# Patient Record
Sex: MALE | Race: BLACK OR AFRICAN AMERICAN | NOT HISPANIC OR LATINO
[De-identification: names, ages, dates, MRNs, and addresses within clinical notes are randomized per-mention and may not be internally consistent; named-entity substitution may affect disease eponyms.]

---

## 2019-09-06 ENCOUNTER — RESULT REVIEW (OUTPATIENT)
Age: 29
End: 2019-09-06

## 2020-10-27 ENCOUNTER — APPOINTMENT (OUTPATIENT)
Dept: ORTHOPEDIC SURGERY | Facility: CLINIC | Age: 30
End: 2020-10-27
Payer: COMMERCIAL

## 2020-10-27 VITALS — BODY MASS INDEX: 30.06 KG/M2 | HEIGHT: 70 IN | WEIGHT: 210 LBS

## 2020-10-27 PROCEDURE — 99203 OFFICE O/P NEW LOW 30 MIN: CPT

## 2020-10-27 PROCEDURE — 99072 ADDL SUPL MATRL&STAF TM PHE: CPT

## 2020-10-27 NOTE — PHYSICAL EXAM
[FreeTextEntry1] : Physical exam revealed a healthy looking patient in no apparent distress patient appears to be fully alert oriented examination of the right knee demonstrates swelling and fullness over the suprapatellar pouch stable knee no gross neurovascular deficit at this stage patient was recommended to be followed and to be seen again with MRI scan of the right knee the need for possible arthrotomy and resection of the process could not be ruled out

## 2020-10-27 NOTE — HISTORY OF PRESENT ILLNESS
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  Over the last 4 years patient was aware about PVNS of the right knee previously patient underwent triple arthroscopies.  At this stage patient having pain tenderness and a effusion and swelling of the right knee.

## 2020-11-03 ENCOUNTER — APPOINTMENT (OUTPATIENT)
Dept: ORTHOPEDIC SURGERY | Facility: CLINIC | Age: 30
End: 2020-11-03
Payer: COMMERCIAL

## 2020-11-03 PROCEDURE — 99213 OFFICE O/P EST LOW 20 MIN: CPT

## 2020-11-03 PROCEDURE — 99072 ADDL SUPL MATRL&STAF TM PHE: CPT

## 2020-11-03 NOTE — PHYSICAL EXAM
[FreeTextEntry1] : Physical exam reveals a healthy looking patient in no apparent distress patient appears to be fully alert oriented examination of the right knee demonstrate joint effusion with a fullness over the right suprapatellar pouch and no skin erythema patient having full range of motion of the right knee.  At this point this condition was discussed with the patient as being persistent pigmented villous nodular synovitis patient was recommended to consider a new chemotherapy treatment as opposed to go ahead with major surgical procedure arrangements were made for the patient to see the oncologist to discuss the new regimen therapy for this condition using turalio capsules.  The nature of this treatment was discussed with the patient and patient was referred to the oncologist to discuss the need for this kind of medicine.  However the need for possible future surgical procedure arthrotomy total synovectomy could not be ruled out.

## 2020-11-03 NOTE — HISTORY OF PRESENT ILLNESS
[FreeTextEntry1] : Patient was seen today in follow-up with a medical history of pigmented villous nodular synovitis right knee previously patient had 2 arthroscopies.  For the last 5 feels patient having intermittent pain swelling joint effusion of the right knee recent MRI scan demonstrate a residual masses over the anterior compartment of the right knee with several soft tissue masses occupying the popliteal area suggesting persistent pigmented villous nodular synovitis

## 2020-11-04 ENCOUNTER — APPOINTMENT (OUTPATIENT)
Dept: ORTHOPEDIC SURGERY | Facility: CLINIC | Age: 30
End: 2020-11-04
Payer: COMMERCIAL

## 2020-11-04 VITALS — BODY MASS INDEX: 30.06 KG/M2 | WEIGHT: 210 LBS | HEIGHT: 70 IN

## 2020-11-04 PROCEDURE — 99213 OFFICE O/P EST LOW 20 MIN: CPT

## 2020-11-04 PROCEDURE — 99072 ADDL SUPL MATRL&STAF TM PHE: CPT

## 2020-11-04 NOTE — HISTORY OF PRESENT ILLNESS
[FreeTextEntry1] : For the last 5 years patient has been complaining about intermittent pain swelling joint effusion over the right knee underwent 2 previous arthroscopies for diffuse PVNS.  Patient was seen yesterday and patient was recommended to be seen by the medical oncologist to obtain a trial of kind of new drug medication treatment.  Arrangements were made for the patient to be seen by the medical oncologist.  However after patient discussed the underlying condition with his family patient refusing to accept that kind of treatment and wished to undergo surgical procedure.

## 2020-11-04 NOTE — PHYSICAL EXAM
[FreeTextEntry1] : Physical exam revealed a healthy looking patient in no apparent distress patient appears to be fully alert oriented examination of the right knee demonstrate joint effusion with fullness and joint collection fluid over the suprapatellar pouch previously reviewed the MRI scan demonstrated residual persistent PVNS of the knee joint including popliteal fossa.  At this stage and arthrotomy of the right knee and total synovectomy was discussed with the patient the nature of the surgical procedure risk potential risk complications were discussed with the patient including progressive degenerative arthritic changes and the possibility of infectious process and septic knee could not be ruled out and after patient and understood the nature of the surgical procedure and potential complication still patient wishing to undergo the surgical procedure and again this was discussed with the patient arrangements are being made for the patient to undergo a arthrotomy and total synovectomy at the same time patient may have to have another surgical procedure open of the right popliteal fossa the risk of possible future local recurrence could not be ruled out the need for further future surgical procedure including the potential future total knee replacement could not be ruled out

## 2020-11-04 NOTE — REASON FOR VISIT
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

## 2020-11-18 ENCOUNTER — TRANSCRIPTION ENCOUNTER (OUTPATIENT)
Age: 30
End: 2020-11-18

## 2020-11-20 ENCOUNTER — OUTPATIENT (OUTPATIENT)
Dept: OUTPATIENT SERVICES | Facility: HOSPITAL | Age: 30
LOS: 1 days | End: 2020-11-20

## 2020-11-20 VITALS
OXYGEN SATURATION: 99 % | SYSTOLIC BLOOD PRESSURE: 118 MMHG | HEART RATE: 55 BPM | DIASTOLIC BLOOD PRESSURE: 82 MMHG | RESPIRATION RATE: 14 BRPM | WEIGHT: 216.05 LBS | HEIGHT: 69 IN | TEMPERATURE: 97 F

## 2020-11-20 DIAGNOSIS — M12.20 VILLONODULAR SYNOVITIS (PIGMENTED), UNSPECIFIED SITE: ICD-10-CM

## 2020-11-20 DIAGNOSIS — M12.269: ICD-10-CM

## 2020-11-20 DIAGNOSIS — Z98.890 OTHER SPECIFIED POSTPROCEDURAL STATES: Chronic | ICD-10-CM

## 2020-11-20 LAB
BLD GP AB SCN SERPL QL: NEGATIVE — SIGNIFICANT CHANGE UP
HCT VFR BLD CALC: 45.7 % — SIGNIFICANT CHANGE UP (ref 39–50)
HGB BLD-MCNC: 14 G/DL — SIGNIFICANT CHANGE UP (ref 13–17)
MCHC RBC-ENTMCNC: 22.5 PG — LOW (ref 27–34)
MCHC RBC-ENTMCNC: 30.6 % — LOW (ref 32–36)
MCV RBC AUTO: 73.5 FL — LOW (ref 80–100)
NRBC # FLD: 0 K/UL — SIGNIFICANT CHANGE UP (ref 0–0)
PLATELET # BLD AUTO: 246 K/UL — SIGNIFICANT CHANGE UP (ref 150–400)
PMV BLD: 11.1 FL — SIGNIFICANT CHANGE UP (ref 7–13)
RBC # BLD: 6.22 M/UL — HIGH (ref 4.2–5.8)
RBC # FLD: 16.2 % — HIGH (ref 10.3–14.5)
RH IG SCN BLD-IMP: POSITIVE — SIGNIFICANT CHANGE UP
WBC # BLD: 4.55 K/UL — SIGNIFICANT CHANGE UP (ref 3.8–10.5)
WBC # FLD AUTO: 4.55 K/UL — SIGNIFICANT CHANGE UP (ref 3.8–10.5)

## 2020-11-20 RX ORDER — SODIUM CHLORIDE 9 MG/ML
1000 INJECTION, SOLUTION INTRAVENOUS
Refills: 0 | Status: DISCONTINUED | OUTPATIENT
Start: 2020-12-03 | End: 2020-12-04

## 2020-11-20 NOTE — H&P PST ADULT - VENOUS THROMBOEMBOLISM FOR WOMEN ONLY
We would like to see you back in 4 months. Please come 30 minutes prior for lab work through your port.  We will also set you up for monthly prot flushes.    If you have any questions please call [phone]    Other instructions:  none    
(0) indicator not present

## 2020-11-20 NOTE — H&P PST ADULT - NEGATIVE GENERAL SYMPTOMS
no polyuria/no polydipsia/no chills/no anorexia/no malaise/no fever/no sweating/no polyphagia/no fatigue/no weight loss

## 2020-11-20 NOTE — H&P PST ADULT - HISTORY OF PRESENT ILLNESS
29y/o male scheduled for right knee arthrotomy and total synovectomy on 12/3/2020.  Pt states, "PVNS hx benign tumor in right knee for the past 6 yrs, growing in size, causing discomfort with activity."

## 2020-11-20 NOTE — H&P PST ADULT - NSICDXPROBLEM_GEN_ALL_CORE_FT
PROBLEM DIAGNOSES  Problem: Synovitis, villonodular, knee  Assessment and Plan: Pt scheduled for right knee arthrotomy and total synovectomy on 12/3/2020.  labs done results pending, Preop instructed to contact surgeons office for preop covid testing appt.  Hibiclens provided, written and verbal instructions given, pt able to verbalize understanding.  Preop teaching done, pt able to verbalize understanding   meds day of surgery pepcid

## 2020-11-20 NOTE — H&P PST ADULT - GASTROINTESTINAL DETAILS
no rebound tenderness/no rigidity/soft/no masses palpable/no organomegaly/bowel sounds normal/no distention/no guarding/no bruit/nontender

## 2020-11-20 NOTE — H&P PST ADULT - NEGATIVE CARDIOVASCULAR SYMPTOMS
no claudication/no palpitations/no paroxysmal nocturnal dyspnea/no peripheral edema/no orthopnea/no chest pain/no dyspnea on exertion

## 2020-11-20 NOTE — H&P PST ADULT - NEGATIVE GENERAL GENITOURINARY SYMPTOMS
no flank pain L/normal urinary frequency/no hematuria/no flank pain R/no bladder infections/no dysuria/no urinary hesitancy

## 2020-12-01 LAB — SARS-COV-2 N GENE NPH QL NAA+PROBE: NOT DETECTED

## 2020-12-02 ENCOUNTER — TRANSCRIPTION ENCOUNTER (OUTPATIENT)
Age: 30
End: 2020-12-02

## 2020-12-03 ENCOUNTER — INPATIENT (INPATIENT)
Facility: HOSPITAL | Age: 30
LOS: 0 days | Discharge: HOME | End: 2020-12-04
Attending: ORTHOPAEDIC SURGERY | Admitting: ORTHOPAEDIC SURGERY
Payer: COMMERCIAL

## 2020-12-03 ENCOUNTER — APPOINTMENT (OUTPATIENT)
Dept: ORTHOPEDIC SURGERY | Facility: HOSPITAL | Age: 30
End: 2020-12-03

## 2020-12-03 ENCOUNTER — RESULT REVIEW (OUTPATIENT)
Age: 30
End: 2020-12-03

## 2020-12-03 VITALS
HEIGHT: 70 IN | OXYGEN SATURATION: 99 % | TEMPERATURE: 98 F | WEIGHT: 214.95 LBS | DIASTOLIC BLOOD PRESSURE: 88 MMHG | SYSTOLIC BLOOD PRESSURE: 144 MMHG | RESPIRATION RATE: 16 BRPM | HEART RATE: 56 BPM

## 2020-12-03 DIAGNOSIS — Z98.890 OTHER SPECIFIED POSTPROCEDURAL STATES: Chronic | ICD-10-CM

## 2020-12-03 DIAGNOSIS — M12.20 VILLONODULAR SYNOVITIS (PIGMENTED), UNSPECIFIED SITE: ICD-10-CM

## 2020-12-03 PROCEDURE — 88305 TISSUE EXAM BY PATHOLOGIST: CPT | Mod: 26

## 2020-12-03 RX ORDER — SENNA PLUS 8.6 MG/1
2 TABLET ORAL AT BEDTIME
Refills: 0 | Status: DISCONTINUED | OUTPATIENT
Start: 2020-12-03 | End: 2020-12-04

## 2020-12-03 RX ORDER — HYDROMORPHONE HYDROCHLORIDE 2 MG/ML
0.5 INJECTION INTRAMUSCULAR; INTRAVENOUS; SUBCUTANEOUS
Refills: 0 | Status: DISCONTINUED | OUTPATIENT
Start: 2020-12-03 | End: 2020-12-03

## 2020-12-03 RX ORDER — OXYCODONE HYDROCHLORIDE 5 MG/1
5 TABLET ORAL EVERY 4 HOURS
Refills: 0 | Status: DISCONTINUED | OUTPATIENT
Start: 2020-12-03 | End: 2020-12-04

## 2020-12-03 RX ORDER — ONDANSETRON 8 MG/1
4 TABLET, FILM COATED ORAL EVERY 6 HOURS
Refills: 0 | Status: DISCONTINUED | OUTPATIENT
Start: 2020-12-03 | End: 2020-12-04

## 2020-12-03 RX ORDER — POLYETHYLENE GLYCOL 3350 17 G/17G
17 POWDER, FOR SOLUTION ORAL DAILY
Refills: 0 | Status: DISCONTINUED | OUTPATIENT
Start: 2020-12-03 | End: 2020-12-04

## 2020-12-03 RX ORDER — ASPIRIN/CALCIUM CARB/MAGNESIUM 324 MG
325 TABLET ORAL DAILY
Refills: 0 | Status: DISCONTINUED | OUTPATIENT
Start: 2020-12-03 | End: 2020-12-04

## 2020-12-03 RX ORDER — SODIUM CHLORIDE 9 MG/ML
1000 INJECTION, SOLUTION INTRAVENOUS
Refills: 0 | Status: DISCONTINUED | OUTPATIENT
Start: 2020-12-03 | End: 2020-12-04

## 2020-12-03 RX ORDER — HYDROMORPHONE HYDROCHLORIDE 2 MG/ML
1 INJECTION INTRAMUSCULAR; INTRAVENOUS; SUBCUTANEOUS
Refills: 0 | Status: DISCONTINUED | OUTPATIENT
Start: 2020-12-03 | End: 2020-12-03

## 2020-12-03 RX ORDER — MAGNESIUM HYDROXIDE 400 MG/1
30 TABLET, CHEWABLE ORAL DAILY
Refills: 0 | Status: DISCONTINUED | OUTPATIENT
Start: 2020-12-03 | End: 2020-12-04

## 2020-12-03 RX ORDER — OXYCODONE HYDROCHLORIDE 5 MG/1
10 TABLET ORAL EVERY 4 HOURS
Refills: 0 | Status: DISCONTINUED | OUTPATIENT
Start: 2020-12-03 | End: 2020-12-04

## 2020-12-03 RX ORDER — VANCOMYCIN HCL 1 G
1000 VIAL (EA) INTRAVENOUS ONCE
Refills: 0 | Status: COMPLETED | OUTPATIENT
Start: 2020-12-04 | End: 2020-12-04

## 2020-12-03 RX ORDER — ACETAMINOPHEN 500 MG
650 TABLET ORAL EVERY 6 HOURS
Refills: 0 | Status: DISCONTINUED | OUTPATIENT
Start: 2020-12-03 | End: 2020-12-04

## 2020-12-03 RX ADMIN — SODIUM CHLORIDE 100 MILLILITER(S): 9 INJECTION, SOLUTION INTRAVENOUS at 19:51

## 2020-12-03 RX ADMIN — HYDROMORPHONE HYDROCHLORIDE 0.5 MILLIGRAM(S): 2 INJECTION INTRAMUSCULAR; INTRAVENOUS; SUBCUTANEOUS at 20:50

## 2020-12-03 RX ADMIN — HYDROMORPHONE HYDROCHLORIDE 0.5 MILLIGRAM(S): 2 INJECTION INTRAMUSCULAR; INTRAVENOUS; SUBCUTANEOUS at 21:05

## 2020-12-03 RX ADMIN — SODIUM CHLORIDE 30 MILLILITER(S): 9 INJECTION, SOLUTION INTRAVENOUS at 13:42

## 2020-12-03 NOTE — H&P PST ADULT - NSSUBSTANCEUSE_GEN_ALL_CORE_SD
Left vm for pt to return call in regards to going over his medications and allergies for his VV appt on tomorrow.   
caffeine/denies etoh and drug abuse

## 2020-12-03 NOTE — BRIEF OPERATIVE NOTE - NSICDXBRIEFPOSTOP_GEN_ALL_CORE_FT
POST-OP DIAGNOSIS:  PVNS (pigmented villonodular synovitis) 03-Dec-2020 19:26:53 Right KNee Delfino Colmenares

## 2020-12-03 NOTE — BRIEF OPERATIVE NOTE - NSICDXBRIEFPREOP_GEN_ALL_CORE_FT
PRE-OP DIAGNOSIS:  PVNS (pigmented villonodular synovitis) 03-Dec-2020 19:26:45 Right KNee Delfino Colmenares   (3) walks occasionally

## 2020-12-03 NOTE — PROGRESS NOTE ADULT - SUBJECTIVE AND OBJECTIVE BOX
ORTHO POST OP CHECK    S: Pt seen and examined. Doing well postoperatively. Pain well controlled. Denies N/V/CP/SOB.       O:   PE:  Gen: NAD, laying comfortably in bed  Resp: Unlabored breathing  MSK: Dressing c/d/i, KI in place          +EHL/FHL/TA/Gas/SOl          +DP/SP/Ramila/Saph/T           2+DP  HV SS              Vital Signs Last 24 Hrs  T(C): 36.8 (03 Dec 2020 21:00), Max: 36.8 (03 Dec 2020 19:25)  T(F): 98.2 (03 Dec 2020 21:00), Max: 98.2 (03 Dec 2020 19:25)  HR: 48 (03 Dec 2020 21:15) (48 - 72)  BP: 127/80 (03 Dec 2020 21:15) (127/80 - 144/88)  BP(mean): 91 (03 Dec 2020 21:15) (87 - 106)  RR: 11 (03 Dec 2020 21:15) (11 - 17)  SpO2: 100% (03 Dec 2020 21:15) (93% - 100%)  I&O's Summary      A/P: 30M s/p R knee PVNS total synovectomy    -Neuro: Multimodal pain control  -Resp: IS  -GI: reg diet  -MSK: OOB, WBAT in KI, PT/OT  -Heme: DVT PPx: A325 QD  FU HV    Ortho

## 2020-12-04 ENCOUNTER — TRANSCRIPTION ENCOUNTER (OUTPATIENT)
Age: 30
End: 2020-12-04

## 2020-12-04 VITALS
RESPIRATION RATE: 16 BRPM | HEART RATE: 66 BPM | TEMPERATURE: 99 F | SYSTOLIC BLOOD PRESSURE: 136 MMHG | DIASTOLIC BLOOD PRESSURE: 79 MMHG | OXYGEN SATURATION: 100 %

## 2020-12-04 LAB
ANION GAP SERPL CALC-SCNC: 11 MMO/L — SIGNIFICANT CHANGE UP (ref 7–14)
BUN SERPL-MCNC: 20 MG/DL — SIGNIFICANT CHANGE UP (ref 7–23)
CALCIUM SERPL-MCNC: 9.3 MG/DL — SIGNIFICANT CHANGE UP (ref 8.4–10.5)
CHLORIDE SERPL-SCNC: 99 MMOL/L — SIGNIFICANT CHANGE UP (ref 98–107)
CO2 SERPL-SCNC: 23 MMOL/L — SIGNIFICANT CHANGE UP (ref 22–31)
CREAT SERPL-MCNC: 1.09 MG/DL — SIGNIFICANT CHANGE UP (ref 0.5–1.3)
GLUCOSE SERPL-MCNC: 124 MG/DL — HIGH (ref 70–99)
HCT VFR BLD CALC: 43.8 % — SIGNIFICANT CHANGE UP (ref 39–50)
HGB BLD-MCNC: 13.6 G/DL — SIGNIFICANT CHANGE UP (ref 13–17)
MCHC RBC-ENTMCNC: 22.8 PG — LOW (ref 27–34)
MCHC RBC-ENTMCNC: 31.1 % — LOW (ref 32–36)
MCV RBC AUTO: 73.4 FL — LOW (ref 80–100)
NRBC # FLD: 0 K/UL — SIGNIFICANT CHANGE UP (ref 0–0)
PLATELET # BLD AUTO: 248 K/UL — SIGNIFICANT CHANGE UP (ref 150–400)
PMV BLD: 9.8 FL — SIGNIFICANT CHANGE UP (ref 7–13)
POTASSIUM SERPL-MCNC: 5 MMOL/L — SIGNIFICANT CHANGE UP (ref 3.5–5.3)
POTASSIUM SERPL-SCNC: 5 MMOL/L — SIGNIFICANT CHANGE UP (ref 3.5–5.3)
RBC # BLD: 5.97 M/UL — HIGH (ref 4.2–5.8)
RBC # FLD: 15.8 % — HIGH (ref 10.3–14.5)
SODIUM SERPL-SCNC: 133 MMOL/L — LOW (ref 135–145)
WBC # BLD: 9.58 K/UL — SIGNIFICANT CHANGE UP (ref 3.8–10.5)
WBC # FLD AUTO: 9.58 K/UL — SIGNIFICANT CHANGE UP (ref 3.8–10.5)

## 2020-12-04 RX ORDER — SENNA PLUS 8.6 MG/1
2 TABLET ORAL AT BEDTIME
Refills: 0 | Status: DISCONTINUED | OUTPATIENT
Start: 2020-12-04 | End: 2020-12-04

## 2020-12-04 RX ORDER — ACETAMINOPHEN 500 MG
3 TABLET ORAL
Qty: 63 | Refills: 3
Start: 2020-12-04 | End: 2020-12-31

## 2020-12-04 RX ORDER — DOCUSATE SODIUM 100 MG
1 CAPSULE ORAL
Qty: 90 | Refills: 0
Start: 2020-12-04 | End: 2021-01-02

## 2020-12-04 RX ORDER — ACETAMINOPHEN 500 MG
650 TABLET ORAL EVERY 6 HOURS
Refills: 0 | Status: DISCONTINUED | OUTPATIENT
Start: 2020-12-04 | End: 2020-12-04

## 2020-12-04 RX ORDER — OXYCODONE HYDROCHLORIDE 5 MG/1
2 TABLET ORAL
Qty: 56 | Refills: 0
Start: 2020-12-04 | End: 2020-12-10

## 2020-12-04 RX ORDER — ONDANSETRON 8 MG/1
4 TABLET, FILM COATED ORAL EVERY 6 HOURS
Refills: 0 | Status: DISCONTINUED | OUTPATIENT
Start: 2020-12-04 | End: 2020-12-04

## 2020-12-04 RX ORDER — ASPIRIN/CALCIUM CARB/MAGNESIUM 324 MG
1 TABLET ORAL
Qty: 42 | Refills: 0
Start: 2020-12-04 | End: 2021-01-14

## 2020-12-04 RX ORDER — OXYCODONE HYDROCHLORIDE 5 MG/1
10 TABLET ORAL EVERY 4 HOURS
Refills: 0 | Status: DISCONTINUED | OUTPATIENT
Start: 2020-12-04 | End: 2020-12-04

## 2020-12-04 RX ORDER — SENNA PLUS 8.6 MG/1
2 TABLET ORAL
Qty: 60 | Refills: 0
Start: 2020-12-04 | End: 2021-01-02

## 2020-12-04 RX ORDER — OXYCODONE HYDROCHLORIDE 5 MG/1
5 TABLET ORAL EVERY 4 HOURS
Refills: 0 | Status: DISCONTINUED | OUTPATIENT
Start: 2020-12-04 | End: 2020-12-04

## 2020-12-04 RX ADMIN — Medication 325 MILLIGRAM(S): at 13:15

## 2020-12-04 RX ADMIN — Medication 650 MILLIGRAM(S): at 13:15

## 2020-12-04 RX ADMIN — Medication 250 MILLIGRAM(S): at 07:08

## 2020-12-04 RX ADMIN — POLYETHYLENE GLYCOL 3350 17 GRAM(S): 17 POWDER, FOR SOLUTION ORAL at 13:17

## 2020-12-04 NOTE — DISCHARGE NOTE PROVIDER - HOSPITAL COURSE
year old is admitted for elective total knee arthroplasty without any intraoperative complications.  Patient is doing well and stable for discharge.  Patient is tolerating physical therapy: weight bearing to leg, gait training, exercises as shown by Physical Therapy.  Keep wound dressing on as is until day of office visit.  Staples/sutures if applicable, to be removed in the office 14 days from date of surgery.  Patient is on for anticoagulation.  Orthopaedic Surgeon   would like patient to call private MD/Internist for appointment postop to maintain continuity of care.  Follow up with  's office in 1-2 weeks.    Istop reviewed 30year old male is admitted for elective Right knee synovectomy for pigmented villonodular synovitis (PVNS) 12/3/2020 without any intraoperative complications.  Patient is doing well and stable for discharge.  Patient is tolerating physical therapy: weight bearing to Right leg in knee immobilizer on at all times, gait training, exercises as shown by Physical Therapy.  Keep wound dressing on as is until day of office visit.  Staples/sutures if applicable, to be removed in the office 14 days from date of surgery.  Patient is on Aspirin (MUST TAKE WITH FOOD) for anticoagulation.  Orthopaedic Surgeon Dr. Gonzalez would like patient to call private MD/Internist for appointment postop to maintain continuity of care.  Follow up with Dr. Gonzalez's office in 1-2 weeks.    Istop reviewed Reference #: [id number]

## 2020-12-04 NOTE — PROGRESS NOTE ADULT - SUBJECTIVE AND OBJECTIVE BOX
Ortho Progress Note  SIDDHARTH LEVY   MRN-[id number]    30yMale is s/p elective Right knee open total synovectomy POD#1 w/out any c/o.  Resting comfortably, NAD, ANO x 3    Vital Signs Last 24 Hrs  T(C): 36.6 (04 Dec 2020 02:16), Max: 36.8 (03 Dec 2020 19:25)  T(F): 97.8 (04 Dec 2020 02:16), Max: 98.3 (03 Dec 2020 22:22)  HR: 63 (04 Dec 2020 02:16) (48 - 72)  BP: 118/73 (04 Dec 2020 02:16) (118/73 - 144/88)  BP(mean): 92 (03 Dec 2020 21:30) (87 - 106)  RR: 16 (04 Dec 2020 02:16) (11 - 17)  SpO2: 100% (04 Dec 2020 02:16) (93% - 100%)  penicillin (Rash)      S/P elective Right knee open total synovectomy  R knee wound postop ace dressing is C/D/I  postop knee immobilizer on - patient not able to SLR due to heaviness of dressing and pain  HV in place: 82cc since postop  motor RLE EHL, TA, GS intact  sensation RLE intact to light touch                          13.6   9.58  )-----------( 248      ( 04 Dec 2020 05:44 )             43.8               A/P Ortho Stable s/p elective Right knee open total synovectomy POD#1  ck Labs this am  Aspirin for anticoagulation   continue Physical Therapy BID: WBAT in KI, OOB for gait training w/crutches  discharge planning today if HV out and when pt is cleared by physical therapy for safe home discharge

## 2020-12-04 NOTE — PHYSICAL THERAPY INITIAL EVALUATION ADULT - RANGE OF MOTION EXAMINATION, REHAB EVAL
bilateral lower extremity ROM was WFL (within functional limits)/bilateral upper extremity ROM was WFL (within functional limits)/except unable to assess right LE knee ROM secondary to knee immobilizer

## 2020-12-04 NOTE — DISCHARGE NOTE PROVIDER - CARE PROVIDER_API CALL
Morgan Gonzalez  ORTHOPEDICS  [address] [place] [address], [address]  [place] [address]  Phone: [phone]  Fax: [phone]  Established Patient  Follow Up Time: 2 weeks

## 2020-12-04 NOTE — OCCUPATIONAL THERAPY INITIAL EVALUATION ADULT - LIVES WITH, PROFILE
Patient lives in a private home with his parents and sister with 4 steps to enter + flight of stairs to bedroom. Patient has a tub shower with no durable medical equipment.

## 2020-12-04 NOTE — DISCHARGE NOTE NURSING/CASE MANAGEMENT/SOCIAL WORK - NSDPDISTO_GEN_ALL_CORE
Home stable, right knee ace dressing with immobilizer in place clean dry and intact, tolerating oob, voiding well, tolerating diet/Home

## 2020-12-04 NOTE — DISCHARGE NOTE NURSING/CASE MANAGEMENT/SOCIAL WORK - PATIENT PORTAL LINK FT
You can access the FollowMyHealth Patient Portal offered by [facility] by registering at the following website: [email or web address]. By joining [facility]’s FollowMyHealth portal, you will also be able to view your health information using other applications (apps) compatible with our system.

## 2020-12-04 NOTE — DISCHARGE NOTE NURSING/CASE MANAGEMENT/SOCIAL WORK - NSDCPECAREGIVERED_GEN_ALL_CORE
carenotes on managing pain at home, d/c medications carenotes on manging pain at home, d/c medications carenotes on managing pain at home, d/c medications, side effects pamphlet

## 2020-12-04 NOTE — DISCHARGE NOTE PROVIDER - NSDCACTIVITY_GEN_ALL_CORE
Do not drive or operate machinery/Walking - Outdoors allowed/Showering allowed/Walking - Indoors allowed/No heavy lifting/straining/Driving allowed/Do not make important decisions/Stairs allowed

## 2020-12-04 NOTE — PHYSICAL THERAPY INITIAL EVALUATION ADULT - GENERAL OBSERVATIONS, REHAB EVAL
Pt received semi-betancourt in bed, right LE knee immobilizer, +hemovac, NAD. Pt agreeable to PT consultation.

## 2020-12-04 NOTE — PHYSICAL THERAPY INITIAL EVALUATION ADULT - ADDITIONAL COMMENTS
Pt lives in house with parents and sister. Pt was independent in functional activities prior to admission without use of assistive device. Pt with previous knee surgeries and previous use of axillary crutches. 4 steps to enter from front of house, 2 steps to enter from back of house. Flight of stairs to bedroom. No railings inside.     Pt left semi-betancourt in bed, NAD. +knee immobilizer. drains intact. +call bell.

## 2020-12-04 NOTE — DISCHARGE NOTE PROVIDER - NSDCCPTREATMENT_GEN_ALL_CORE_FT
PRINCIPAL PROCEDURE  Procedure: Synovectomy, knee, anterior approach  Findings and Treatment: dictated operative note  weight bearing as tolerated to Right leg  call Surgeon's office to make appointment for 1-2 weeks from date of surgery Dr. Gonzalez [phone]

## 2020-12-04 NOTE — DISCHARGE NOTE PROVIDER - NSDCCPCAREPLAN_GEN_ALL_CORE_FT
PRINCIPAL DISCHARGE DIAGNOSIS  Diagnosis: PVNS (pigmented villonodular synovitis)  Assessment and Plan of Treatment: 30year old male is admitted for elective Right knee synovectomy for pigmented villonodular synovitis (PVNS) 12/3/2020 without any intraoperative complications.  Patient is doing well and stable for discharge.  Patient is tolerating physical therapy: weight bearing to Right leg in knee immobilizer on at all times, gait training, exercises as shown by Physical Therapy.  Keep wound dressing on as is until day of office visit.  Staples/sutures if applicable, to be removed in the office 14 days from date of surgery.  Patient is on Aspirin (MUST TAKE WITH FOOD) for anticoagulation.  Orthopaedic Surgeon Dr. Gonzalez would like patient to call private MD/Internist for appointment postop to maintain continuity of care.  Follow up with Dr. Gonzalez's office in 1-2 weeks.  Istop reviewed Reference #: [id number]

## 2020-12-04 NOTE — DISCHARGE NOTE NURSING/CASE MANAGEMENT/SOCIAL WORK - NSDCPNINST_GEN_ALL_CORE
Notify Dr Gonzalez if you experience any increase in pain not relieved with medication, any redness, drainage or swelling around incision or any fever >100.4.  Drink plenty of fluids.  No heavy lifting or straining.  Use over the counter stool softeners to assist with constipation which can be a side effect of narcotic pain medication.

## 2020-12-04 NOTE — OCCUPATIONAL THERAPY INITIAL EVALUATION ADULT - GENERAL OBSERVATIONS, REHAB EVAL
Patient received semisupine in bed in NAD and agreeable to participate in OT evaluation. +right LE in knee immobilizer. +hemovac.

## 2020-12-04 NOTE — DISCHARGE NOTE PROVIDER - NSDCMRMEDTOKEN_GEN_ALL_CORE_FT
no home medications:    acetaminophen 325 mg oral tablet: 3 tab(s) orally every 8 hours standing around the clock until follow up with Ortho Surgeon MDD:TWELVE TABS  aspirin 325 mg oral delayed release tablet: 1 tab(s) orally once a day (with meals)   Colace 100 mg oral capsule: 1 cap(s) orally 3 times a day   oxyCODONE 5 mg oral tablet: 1 tab for Moderate pain or 2 tabs for Severe pain as needed orally every 4-6 hours  begin tapering off as pain decreases MDD:EIGHT TABS  senna oral tablet: 2 tab(s) orally once a day (at bedtime)

## 2020-12-04 NOTE — OCCUPATIONAL THERAPY INITIAL EVALUATION ADULT - ANTICIPATED DISCHARGE DISPOSITION, OT EVAL
Anticipating home with no OT needs following discharge. OT to continue to follow. Patient reports his family is able to assist as needed at home.

## 2020-12-07 PROBLEM — M12.20 VILLONODULAR SYNOVITIS (PIGMENTED), UNSPECIFIED SITE: Chronic | Status: ACTIVE | Noted: 2020-11-20

## 2020-12-12 LAB — SURGICAL PATHOLOGY STUDY: SIGNIFICANT CHANGE UP

## 2020-12-16 ENCOUNTER — APPOINTMENT (OUTPATIENT)
Dept: ORTHOPEDIC SURGERY | Facility: CLINIC | Age: 30
End: 2020-12-16
Payer: COMMERCIAL

## 2020-12-16 VITALS — HEART RATE: 78 BPM | DIASTOLIC BLOOD PRESSURE: 89 MMHG | SYSTOLIC BLOOD PRESSURE: 142 MMHG

## 2020-12-16 PROCEDURE — 99024 POSTOP FOLLOW-UP VISIT: CPT

## 2020-12-16 NOTE — HISTORY OF PRESENT ILLNESS
[de-identified] : Patient was seen today 2 weeks following arthrotomy of the right knee and total synovectomy for pigmented villous nodular synovitis the surgical procedure was without any complications [de-identified] : On examination today patient is doing quite good having no significant complaints surgical wound healed nice no swelling no discharge no redness staples were removed patient was instructed to start gradual physical therapy with range of motion at the knee and to be seen again in 3 months for follow-up the need for possible future surgical father surgical procedure as needed could not be ruled out

## 2021-01-26 ENCOUNTER — APPOINTMENT (OUTPATIENT)
Age: 31
End: 2021-01-26
Payer: COMMERCIAL

## 2021-01-26 PROCEDURE — 99024 POSTOP FOLLOW-UP VISIT: CPT

## 2021-01-26 NOTE — HISTORY OF PRESENT ILLNESS
[FreeTextEntry1] : Patient was seen today in follow-up several months post arthrotomy and total synovectomy of the right knee for diffuse pigmented villous nodular synovitis the surgical procedure was without any complication gradually patient regained full motion over the right knee at the same time patient having a soft tissue mass over the popliteal fossa for which patient was recommended to be followed

## 2021-01-26 NOTE — PHYSICAL EXAM
[FreeTextEntry1] : Physical exam revealed a healthy looking patient in no apparent distress examination of the right knee demonstrate fully healed surgical scar over the anterior aspect of the knee patient regained full motion of the knee no joint effusion no swelling and at this point patient was recommended to be followed conservatively and to be seen again in 6 months with a new MRI scan of the right knee the need for possible further future surgical procedures could not be ruled out

## 2021-06-08 ENCOUNTER — APPOINTMENT (OUTPATIENT)
Dept: ORTHOPEDIC SURGERY | Facility: CLINIC | Age: 31
End: 2021-06-08
Payer: COMMERCIAL

## 2021-06-08 PROCEDURE — 99213 OFFICE O/P EST LOW 20 MIN: CPT

## 2021-06-08 PROCEDURE — 99072 ADDL SUPL MATRL&STAF TM PHE: CPT

## 2021-06-08 NOTE — HISTORY OF PRESENT ILLNESS
[FreeTextEntry1] : Patient was seen today in follow-up several months post right total synovectomy for pigmented villous nodular synovitis the surgical procedure was without any complication gradually patient return to a full level of activity at this stage patient had no significant complain regained full motion of the the right knee

## 2021-06-08 NOTE — PHYSICAL EXAM
[FreeTextEntry1] : Physical exam revealed a healthy looking patient in no apparent distress patient appears to be fully alert oriented having no significant complaints examination of the right leg demonstrate full range of motion at the hip knee and ankle joint no swelling no joint effusion surgical scar healed fully over the right knee stable knee and at this stage patient was recommended to obtain a follow-up MRI scan of the right knee and to be seen again for follow-up the risk of possible future local recurrence and the need for future father surgical procedures as needed could not be ruled out

## 2022-10-11 ENCOUNTER — APPOINTMENT (OUTPATIENT)
Dept: ORTHOPEDIC SURGERY | Facility: CLINIC | Age: 32
End: 2022-10-11

## 2022-10-11 VITALS — WEIGHT: 201 LBS | HEIGHT: 71 IN | BODY MASS INDEX: 28.14 KG/M2

## 2022-10-11 DIAGNOSIS — M12.20 VILLONODULAR SYNOVITIS (PIGMENTED), UNSPECIFIED SITE: ICD-10-CM

## 2022-10-11 PROCEDURE — 99213 OFFICE O/P EST LOW 20 MIN: CPT

## 2022-10-11 NOTE — PHYSICAL EXAM
[FreeTextEntry1] : Physical exam revealed a healthy looking patient in no apparent distress patient appears to be fully alert oriented examination of the right knee demonstrate fully healed surgical scar patient regained full motion over the right knee no swelling no joint effusion patient is very pleased with the surgical outcome at this stage patient was recommended to be followed and to be seen again in 9 months for follow-up

## 2022-10-11 NOTE — HISTORY OF PRESENT ILLNESS
[FreeTextEntry1] : This is a follow-up visit of a 32 years old male that several months ago underwent a total synovectomy for PVNS of the right knee the surgical procedure was without any complication gradually patient return to full level of activity at this stage patient having no pain

## 2025-07-07 ENCOUNTER — APPOINTMENT (OUTPATIENT)
Dept: ORTHOPEDIC SURGERY | Facility: CLINIC | Age: 35
End: 2025-07-07
Payer: COMMERCIAL

## 2025-07-07 VITALS — BODY MASS INDEX: 30.8 KG/M2 | WEIGHT: 220 LBS | HEIGHT: 71 IN

## 2025-07-07 PROBLEM — M17.11 RIGHT KNEE DJD: Status: ACTIVE | Noted: 2025-07-07

## 2025-07-07 PROCEDURE — 99213 OFFICE O/P EST LOW 20 MIN: CPT

## 2025-07-07 PROCEDURE — 73564 X-RAY EXAM KNEE 4 OR MORE: CPT | Mod: RT

## 2025-07-07 RX ORDER — MELOXICAM 15 MG/1
15 TABLET ORAL
Qty: 30 | Refills: 1 | Status: ACTIVE | COMMUNITY
Start: 2025-07-07 | End: 1900-01-01

## 2025-08-11 ENCOUNTER — APPOINTMENT (OUTPATIENT)
Dept: ORTHOPEDIC SURGERY | Facility: CLINIC | Age: 35
End: 2025-08-11
Payer: COMMERCIAL

## 2025-08-11 DIAGNOSIS — M12.20 VILLONODULAR SYNOVITIS (PIGMENTED), UNSPECIFIED SITE: ICD-10-CM

## 2025-08-11 PROCEDURE — 99214 OFFICE O/P EST MOD 30 MIN: CPT | Mod: 25

## 2025-08-11 PROCEDURE — 20610 DRAIN/INJ JOINT/BURSA W/O US: CPT | Mod: RT

## 2025-08-11 RX ORDER — LIDOCAINE HCL/PF 10 MG/ML
1 VIAL (ML) INJECTION
Refills: 0 | Status: COMPLETED | OUTPATIENT
Start: 2025-08-11

## 2025-08-11 RX ORDER — METHYLPRED ACET/NACL,ISO-OS/PF 40 MG/ML
40 VIAL (ML) INJECTION
Refills: 0 | Status: COMPLETED | OUTPATIENT
Start: 2025-08-11

## 2025-08-11 RX ADMIN — METHYLPREDNISOLONE ACETATE 1 MG/ML: 40 INJECTION, SUSPENSION INTRA-ARTICULAR; INTRALESIONAL; INTRAMUSCULAR; SOFT TISSUE at 00:00

## 2025-08-11 RX ADMIN — LIDOCAINE HYDROCHLORIDE 2 %: 10 INJECTION, SOLUTION INFILTRATION; PERINEURAL at 00:00
